# Patient Record
Sex: MALE | Employment: FULL TIME | URBAN - METROPOLITAN AREA
[De-identification: names, ages, dates, MRNs, and addresses within clinical notes are randomized per-mention and may not be internally consistent; named-entity substitution may affect disease eponyms.]

---

## 2024-11-19 ENCOUNTER — OFFICE VISIT (OUTPATIENT)
Dept: URGENT CARE | Age: 39
End: 2024-11-19
Payer: COMMERCIAL

## 2024-11-19 ENCOUNTER — ANCILLARY PROCEDURE (OUTPATIENT)
Dept: URGENT CARE | Age: 39
End: 2024-11-19
Payer: COMMERCIAL

## 2024-11-19 VITALS
TEMPERATURE: 98.6 F | BODY MASS INDEX: 30.8 KG/M2 | RESPIRATION RATE: 17 BRPM | OXYGEN SATURATION: 98 % | DIASTOLIC BLOOD PRESSURE: 85 MMHG | HEIGHT: 71 IN | SYSTOLIC BLOOD PRESSURE: 115 MMHG | WEIGHT: 220 LBS | HEART RATE: 78 BPM

## 2024-11-19 DIAGNOSIS — R05.2 SUBACUTE COUGH: ICD-10-CM

## 2024-11-19 DIAGNOSIS — J40 BRONCHITIS: Primary | ICD-10-CM

## 2024-11-19 PROCEDURE — 99203 OFFICE O/P NEW LOW 30 MIN: CPT | Performed by: STUDENT IN AN ORGANIZED HEALTH CARE EDUCATION/TRAINING PROGRAM

## 2024-11-19 PROCEDURE — 3008F BODY MASS INDEX DOCD: CPT | Performed by: STUDENT IN AN ORGANIZED HEALTH CARE EDUCATION/TRAINING PROGRAM

## 2024-11-19 PROCEDURE — 71046 X-RAY EXAM CHEST 2 VIEWS: CPT | Performed by: STUDENT IN AN ORGANIZED HEALTH CARE EDUCATION/TRAINING PROGRAM

## 2024-11-19 RX ORDER — DICYCLOMINE HYDROCHLORIDE 10 MG/1
CAPSULE ORAL
COMMUNITY
Start: 2023-12-11

## 2024-11-19 RX ORDER — ALBUTEROL SULFATE 90 UG/1
2 INHALANT RESPIRATORY (INHALATION) EVERY 6 HOURS PRN
Qty: 18 G | Refills: 0 | Status: SHIPPED | OUTPATIENT
Start: 2024-11-19 | End: 2024-12-14

## 2024-11-19 RX ORDER — BENZONATATE 200 MG/1
200 CAPSULE ORAL 3 TIMES DAILY PRN
Qty: 15 CAPSULE | Refills: 0 | Status: SHIPPED | OUTPATIENT
Start: 2024-11-19

## 2024-11-19 RX ORDER — PANTOPRAZOLE SODIUM 40 MG/1
TABLET, DELAYED RELEASE ORAL
COMMUNITY
Start: 2024-11-17

## 2024-11-19 RX ORDER — PREDNISONE 20 MG/1
20 TABLET ORAL DAILY
Qty: 5 TABLET | Refills: 0 | Status: SHIPPED | OUTPATIENT
Start: 2024-11-19 | End: 2024-11-24

## 2024-11-19 NOTE — PROGRESS NOTES
"Subjective   Patient ID: Roberto Canales is a 39 y.o. male. They present today with a chief complaint of Other (Chest congestion - for 30days /Staying in the chest no other sx ).    History of Present Illness  Roberto is a 39 year old male who presents to the urgent care for evaluation of cough and chest congestion for about 30 days duration.  Patient feels persistent fullness though cough has gradually improved with time.  Patient denies chest pain, shortness of breath or fever.  Patient denies significantly productive cough however states initially had sore throat and productive cough early on into illness.  Patient otherwise denies any other symptoms or concerns and is seeking evaluation reassurance.    Past Medical History  Allergies as of 11/19/2024    (No Known Allergies)       (Not in a hospital admission)       No past medical history on file.    No past surgical history on file.     reports that he has quit smoking. His smoking use included cigarettes. He has never been exposed to tobacco smoke. He has never used smokeless tobacco.    Review of Systems  A 10-point review of systems was performed, otherwise unremarkable unless stated in the history of present illness.                Objective    Vitals:    11/19/24 1438   BP: 115/85   Pulse: 78   Resp: 17   Temp: 37 °C (98.6 °F)   SpO2: 98%   Weight: 99.8 kg (220 lb)   Height: 1.803 m (5' 11\")     No LMP for male patient.    Gen: Vitals noted and reviewed, no evidence of acute distress, well developed and afebrile.   Psych: Mood and affect appropriate for setting.  Head/Face: Atraumatic and normocephalic.   Neuro: No focal deficits noted.  ENT: TMs clear bilaterally, EACs unremarkable. Mastoids non-tender. Posterior oropharynx without erythema, exudate, or swelling. Uvula is in the midline and non-edematous.   Neck: Supple. No meningismus through full range of motion. No lymphadenopathy.   Cardiac: Regular rate and rhythm no murmur.   Lungs: Clear to auscultation " throughout, No evidence of wheezing, rhonchi or crackles. Good aeration throughout.     Extremities: Symmetrical, No peripheral edema, Negative Homans' sign b/l  Skin: Without evidence of ecchymosis, wounds, or rashes.      Point of Care Test & Imaging Results from this visit  No results found for this visit on 11/19/24.   XR chest 2 views    Result Date: 11/19/2024  Interpreted By:  Nicole Lopez, STUDY: XR CHEST 2 VIEWS;  11/19/2024 3:03 pm   INDICATION: Signs/Symptoms:Cough for 4 weeks.   COMPARISON: None.   ACCESSION NUMBER(S): [id number]   ORDERING CLINICIAN: SARAHI CARDOZA   FINDINGS: The heart is normal in size.   There is no consolidation or pleural fluid.   The mediastinum and bones are unremarkable.   COMPARISON OF FINDING:       No acute cardiopulmonary disease.   MACRO: none   Signed by: Nicole Lopez 11/19/2024 3:07 PM Dictation workstation:   [id number]     Diagnostic study results (if any) were reviewed by Sarahi Cardoza DO.    Assessment/Plan   Allergies, medications, history, and pertinent labs/EKGs/Imaging reviewed by Sarahi Cardoza DO.     Medical Decision Making  Discussed with the patient symptoms and clinical presentation findings suggestive of a subacute cough with questionable developing bronchitis versus post viral cough syndrome.  We advise close symptom monitoring and supportive treatment.  Given the duration of the patient's cough with gradual improvement we did agree to prescribe a short course of prednisone along with an albuterol inhaler and Tessalon Perles for added symptom relief.  Advised patient to closely follow-up with primary care provider on cough symptoms. We advised seeking immediate emergency medical attention if symptoms fail to improve, worsen or any concerning symptoms arise. Patient voiced full understanding and agreement to plan.    Orders and Diagnoses  Diagnoses and all orders for this visit:  Bronchitis  -     albuterol 90 mcg/actuation inhaler; Inhale 2 puffs every 6  hours if needed for wheezing (Cough) for up to 25 days.  -     benzonatate (Tessalon) 200 mg capsule; Take 1 capsule (200 mg) by mouth 3 times a day as needed for cough. Do not crush or chew.  -     predniSONE (Deltasone) 20 mg tablet; Take 1 tablet (20 mg) by mouth once daily for 5 days. Take with food. No NSAIDs with this  Subacute cough  -     XR chest 2 views; Future  -     predniSONE (Deltasone) 20 mg tablet; Take 1 tablet (20 mg) by mouth once daily for 5 days. Take with food. No NSAIDs with this      Medical Admin Record      Patient disposition: Home    Electronically signed by Ricky Armijo DO  3:34 PM